# Patient Record
Sex: MALE | Race: WHITE | NOT HISPANIC OR LATINO | URBAN - METROPOLITAN AREA
[De-identification: names, ages, dates, MRNs, and addresses within clinical notes are randomized per-mention and may not be internally consistent; named-entity substitution may affect disease eponyms.]

---

## 2017-10-13 ENCOUNTER — OUTPATIENT (OUTPATIENT)
Dept: OUTPATIENT SERVICES | Facility: HOSPITAL | Age: 62
LOS: 1 days | Discharge: HOME | End: 2017-10-13
Payer: COMMERCIAL

## 2017-10-13 VITALS
WEIGHT: 201.94 LBS | RESPIRATION RATE: 14 BRPM | TEMPERATURE: 98 F | HEIGHT: 70 IN | SYSTOLIC BLOOD PRESSURE: 151 MMHG | OXYGEN SATURATION: 98 % | HEART RATE: 78 BPM | DIASTOLIC BLOOD PRESSURE: 93 MMHG

## 2017-10-13 DIAGNOSIS — Z98.890 OTHER SPECIFIED POSTPROCEDURAL STATES: Chronic | ICD-10-CM

## 2017-10-13 DIAGNOSIS — Z90.49 ACQUIRED ABSENCE OF OTHER SPECIFIED PARTS OF DIGESTIVE TRACT: Chronic | ICD-10-CM

## 2017-10-13 DIAGNOSIS — M16.12 UNILATERAL PRIMARY OSTEOARTHRITIS, LEFT HIP: ICD-10-CM

## 2017-10-13 DIAGNOSIS — Z90.89 ACQUIRED ABSENCE OF OTHER ORGANS: Chronic | ICD-10-CM

## 2017-10-13 DIAGNOSIS — Z96.641 PRESENCE OF RIGHT ARTIFICIAL HIP JOINT: Chronic | ICD-10-CM

## 2017-10-13 LAB
ANION GAP SERPL CALC-SCNC: 6 MMOL/L — SIGNIFICANT CHANGE UP (ref 5–17)
APPEARANCE UR: CLEAR — SIGNIFICANT CHANGE UP
BASOPHILS # BLD AUTO: 0.1 K/UL — SIGNIFICANT CHANGE UP (ref 0–0.2)
BASOPHILS NFR BLD AUTO: 1 % — SIGNIFICANT CHANGE UP (ref 0–2)
BILIRUB UR-MCNC: NEGATIVE — SIGNIFICANT CHANGE UP
BUN SERPL-MCNC: 18 MG/DL — SIGNIFICANT CHANGE UP (ref 7–23)
CALCIUM SERPL-MCNC: 8.9 MG/DL — SIGNIFICANT CHANGE UP (ref 8.5–10.1)
CHLORIDE SERPL-SCNC: 105 MMOL/L — SIGNIFICANT CHANGE UP (ref 96–108)
CO2 SERPL-SCNC: 28 MMOL/L — SIGNIFICANT CHANGE UP (ref 22–31)
COLOR SPEC: YELLOW — SIGNIFICANT CHANGE UP
CREAT SERPL-MCNC: 0.89 MG/DL — SIGNIFICANT CHANGE UP (ref 0.5–1.3)
DIFF PNL FLD: NEGATIVE — SIGNIFICANT CHANGE UP
EOSINOPHIL # BLD AUTO: 0.3 K/UL — SIGNIFICANT CHANGE UP (ref 0–0.5)
EOSINOPHIL NFR BLD AUTO: 4.4 % — SIGNIFICANT CHANGE UP (ref 0–6)
GLUCOSE SERPL-MCNC: 92 MG/DL — SIGNIFICANT CHANGE UP (ref 70–99)
GLUCOSE UR QL: NEGATIVE MG/DL — SIGNIFICANT CHANGE UP
HCT VFR BLD CALC: 43.4 % — SIGNIFICANT CHANGE UP (ref 39–50)
HGB BLD-MCNC: 14.7 G/DL — SIGNIFICANT CHANGE UP (ref 13–17)
KETONES UR-MCNC: NEGATIVE — SIGNIFICANT CHANGE UP
LEUKOCYTE ESTERASE UR-ACNC: NEGATIVE — SIGNIFICANT CHANGE UP
LYMPHOCYTES # BLD AUTO: 2 K/UL — SIGNIFICANT CHANGE UP (ref 1–3.3)
LYMPHOCYTES # BLD AUTO: 29.1 % — SIGNIFICANT CHANGE UP (ref 13–44)
MCHC RBC-ENTMCNC: 30.3 PG — SIGNIFICANT CHANGE UP (ref 27–34)
MCHC RBC-ENTMCNC: 34 GM/DL — SIGNIFICANT CHANGE UP (ref 32–36)
MCV RBC AUTO: 89.2 FL — SIGNIFICANT CHANGE UP (ref 80–100)
MONOCYTES # BLD AUTO: 0.6 K/UL — SIGNIFICANT CHANGE UP (ref 0–0.9)
MONOCYTES NFR BLD AUTO: 8.1 % — SIGNIFICANT CHANGE UP (ref 2–14)
NEUTROPHILS # BLD AUTO: 4 K/UL — SIGNIFICANT CHANGE UP (ref 1.8–7.4)
NEUTROPHILS NFR BLD AUTO: 57.4 % — SIGNIFICANT CHANGE UP (ref 43–77)
NITRITE UR-MCNC: NEGATIVE — SIGNIFICANT CHANGE UP
PH UR: 7 — SIGNIFICANT CHANGE UP (ref 5–8)
PLATELET # BLD AUTO: 248 K/UL — SIGNIFICANT CHANGE UP (ref 150–400)
POTASSIUM SERPL-MCNC: 4.1 MMOL/L — SIGNIFICANT CHANGE UP (ref 3.5–5.3)
POTASSIUM SERPL-SCNC: 4.1 MMOL/L — SIGNIFICANT CHANGE UP (ref 3.5–5.3)
PROT UR-MCNC: NEGATIVE MG/DL — SIGNIFICANT CHANGE UP
RBC # BLD: 4.86 M/UL — SIGNIFICANT CHANGE UP (ref 4.2–5.8)
RBC # FLD: 11.9 % — SIGNIFICANT CHANGE UP (ref 10.3–14.5)
SODIUM SERPL-SCNC: 139 MMOL/L — SIGNIFICANT CHANGE UP (ref 135–145)
SP GR SPEC: 1 — LOW (ref 1.01–1.02)
TYPE + AB SCN PNL BLD: SIGNIFICANT CHANGE UP
UROBILINOGEN FLD QL: NEGATIVE MG/DL — SIGNIFICANT CHANGE UP
WBC # BLD: 7 K/UL — SIGNIFICANT CHANGE UP (ref 3.8–10.5)
WBC # FLD AUTO: 7 K/UL — SIGNIFICANT CHANGE UP (ref 3.8–10.5)

## 2017-10-13 PROCEDURE — 93010 ELECTROCARDIOGRAM REPORT: CPT

## 2017-10-13 NOTE — H&P PST ADULT - ASSESSMENT
61 yo male scheduled for left THR with Dr. Gregory on 10/31/17    1. Labs as per surgeon  2. EKG  3. Medical clearance with PCP Dr Duran  4. discussed EZ sponges, mupirocin & day of procedure instructions  5. stat PTT, PT/INR on admit  6. Instructed to attend Joint Education Class wednesday 10/18/17

## 2017-10-13 NOTE — H&P PST ADULT - HISTORY OF PRESENT ILLNESS
61 yo male presents to PST prior to proposed procedure. c/o left hip pain x 18 months that increases with walking & stairs. Reports being woken up out of his sleep from pain. Reports pain 6/10 managed with ibuprofen daily x 2 months. Reports right THR 2016 with Dr Gregory.  now for said procedure.

## 2017-10-13 NOTE — H&P PST ADULT - PSH
H/O arthroscopy  right knee x 2  left knee x 1  S/P appendectomy    S/P colonoscopy    Status post total hip replacement, right

## 2017-10-14 LAB
MRSA PCR RESULT.: SIGNIFICANT CHANGE UP
S AUREUS DNA NOSE QL NAA+PROBE: SIGNIFICANT CHANGE UP

## 2017-10-30 RX ORDER — OXYCODONE HYDROCHLORIDE 5 MG/1
10 TABLET ORAL ONCE
Qty: 0 | Refills: 0 | Status: DISCONTINUED | OUTPATIENT
Start: 2017-10-31 | End: 2017-10-31

## 2017-10-30 RX ORDER — OXYCODONE HYDROCHLORIDE 5 MG/1
10 TABLET ORAL EVERY 12 HOURS
Qty: 0 | Refills: 0 | Status: DISCONTINUED | OUTPATIENT
Start: 2017-10-31 | End: 2017-11-02

## 2017-10-30 RX ORDER — OXYCODONE HYDROCHLORIDE 5 MG/1
10 TABLET ORAL EVERY 4 HOURS
Qty: 0 | Refills: 0 | Status: DISCONTINUED | OUTPATIENT
Start: 2017-10-31 | End: 2017-11-02

## 2017-10-30 RX ORDER — HYDROMORPHONE HYDROCHLORIDE 2 MG/ML
0.5 INJECTION INTRAMUSCULAR; INTRAVENOUS; SUBCUTANEOUS
Qty: 0 | Refills: 0 | Status: DISCONTINUED | OUTPATIENT
Start: 2017-10-31 | End: 2017-11-02

## 2017-10-30 RX ORDER — PANTOPRAZOLE SODIUM 20 MG/1
40 TABLET, DELAYED RELEASE ORAL ONCE
Qty: 0 | Refills: 0 | Status: COMPLETED | OUTPATIENT
Start: 2017-10-31 | End: 2017-10-31

## 2017-10-30 RX ORDER — ACETAMINOPHEN 500 MG
650 TABLET ORAL EVERY 6 HOURS
Qty: 0 | Refills: 0 | Status: DISCONTINUED | OUTPATIENT
Start: 2017-10-31 | End: 2017-10-31

## 2017-10-30 RX ORDER — OXYCODONE HYDROCHLORIDE 5 MG/1
5 TABLET ORAL EVERY 4 HOURS
Qty: 0 | Refills: 0 | Status: DISCONTINUED | OUTPATIENT
Start: 2017-10-31 | End: 2017-11-02

## 2017-10-30 RX ORDER — ACETAMINOPHEN 500 MG
650 TABLET ORAL ONCE
Qty: 0 | Refills: 0 | Status: COMPLETED | OUTPATIENT
Start: 2017-10-31 | End: 2017-10-31

## 2017-10-31 ENCOUNTER — INPATIENT (INPATIENT)
Facility: HOSPITAL | Age: 62
LOS: 1 days | Discharge: HOME HEALTH | End: 2017-11-02
Attending: ORTHOPAEDIC SURGERY | Admitting: ORTHOPAEDIC SURGERY
Payer: COMMERCIAL

## 2017-10-31 VITALS
HEART RATE: 86 BPM | SYSTOLIC BLOOD PRESSURE: 141 MMHG | WEIGHT: 201.94 LBS | OXYGEN SATURATION: 99 % | DIASTOLIC BLOOD PRESSURE: 90 MMHG | RESPIRATION RATE: 16 BRPM | TEMPERATURE: 98 F | HEIGHT: 70 IN

## 2017-10-31 DIAGNOSIS — Z96.641 PRESENCE OF RIGHT ARTIFICIAL HIP JOINT: Chronic | ICD-10-CM

## 2017-10-31 DIAGNOSIS — Z98.890 OTHER SPECIFIED POSTPROCEDURAL STATES: Chronic | ICD-10-CM

## 2017-10-31 DIAGNOSIS — Z90.49 ACQUIRED ABSENCE OF OTHER SPECIFIED PARTS OF DIGESTIVE TRACT: Chronic | ICD-10-CM

## 2017-10-31 LAB
ANION GAP SERPL CALC-SCNC: 5 MMOL/L — SIGNIFICANT CHANGE UP (ref 5–17)
APTT BLD: 30.1 SEC — SIGNIFICANT CHANGE UP (ref 27.5–37.4)
BUN SERPL-MCNC: 21 MG/DL — SIGNIFICANT CHANGE UP (ref 7–23)
CALCIUM SERPL-MCNC: 7.9 MG/DL — LOW (ref 8.5–10.1)
CHLORIDE SERPL-SCNC: 113 MMOL/L — HIGH (ref 96–108)
CO2 SERPL-SCNC: 27 MMOL/L — SIGNIFICANT CHANGE UP (ref 22–31)
CREAT SERPL-MCNC: 0.92 MG/DL — SIGNIFICANT CHANGE UP (ref 0.5–1.3)
GLUCOSE SERPL-MCNC: 96 MG/DL — SIGNIFICANT CHANGE UP (ref 70–99)
HCT VFR BLD CALC: 37.4 % — LOW (ref 39–50)
HGB BLD-MCNC: 12.3 G/DL — LOW (ref 13–17)
INR BLD: 1.04 RATIO — SIGNIFICANT CHANGE UP (ref 0.88–1.16)
MCHC RBC-ENTMCNC: 29.7 PG — SIGNIFICANT CHANGE UP (ref 27–34)
MCHC RBC-ENTMCNC: 32.9 GM/DL — SIGNIFICANT CHANGE UP (ref 32–36)
MCV RBC AUTO: 90.3 FL — SIGNIFICANT CHANGE UP (ref 80–100)
PLATELET # BLD AUTO: 219 K/UL — SIGNIFICANT CHANGE UP (ref 150–400)
POTASSIUM SERPL-MCNC: 4.4 MMOL/L — SIGNIFICANT CHANGE UP (ref 3.5–5.3)
POTASSIUM SERPL-SCNC: 4.4 MMOL/L — SIGNIFICANT CHANGE UP (ref 3.5–5.3)
PROTHROM AB SERPL-ACNC: 11.2 SEC — SIGNIFICANT CHANGE UP (ref 9.8–12.7)
RBC # BLD: 4.14 M/UL — LOW (ref 4.2–5.8)
RBC # FLD: 12.4 % — SIGNIFICANT CHANGE UP (ref 10.3–14.5)
SODIUM SERPL-SCNC: 145 MMOL/L — SIGNIFICANT CHANGE UP (ref 135–145)
WBC # BLD: 6.6 K/UL — SIGNIFICANT CHANGE UP (ref 3.8–10.5)
WBC # FLD AUTO: 6.6 K/UL — SIGNIFICANT CHANGE UP (ref 3.8–10.5)

## 2017-10-31 PROCEDURE — 73501 X-RAY EXAM HIP UNI 1 VIEW: CPT | Mod: 26

## 2017-10-31 PROCEDURE — 88305 TISSUE EXAM BY PATHOLOGIST: CPT | Mod: 26

## 2017-10-31 RX ORDER — DOCUSATE SODIUM 100 MG
100 CAPSULE ORAL THREE TIMES A DAY
Qty: 0 | Refills: 0 | Status: DISCONTINUED | OUTPATIENT
Start: 2017-10-31 | End: 2017-11-02

## 2017-10-31 RX ORDER — FERROUS SULFATE 325(65) MG
325 TABLET ORAL
Qty: 0 | Refills: 0 | Status: DISCONTINUED | OUTPATIENT
Start: 2017-10-31 | End: 2017-11-02

## 2017-10-31 RX ORDER — ENOXAPARIN SODIUM 100 MG/ML
40 INJECTION SUBCUTANEOUS EVERY 24 HOURS
Qty: 0 | Refills: 0 | Status: DISCONTINUED | OUTPATIENT
Start: 2017-11-01 | End: 2017-11-01

## 2017-10-31 RX ORDER — DIPHENHYDRAMINE HCL 50 MG
25 CAPSULE ORAL AT BEDTIME
Qty: 0 | Refills: 0 | Status: DISCONTINUED | OUTPATIENT
Start: 2017-10-31 | End: 2017-11-02

## 2017-10-31 RX ORDER — BENZOCAINE AND MENTHOL 5; 1 G/100ML; G/100ML
1 LIQUID ORAL EVERY 4 HOURS
Qty: 0 | Refills: 0 | Status: DISCONTINUED | OUTPATIENT
Start: 2017-10-31 | End: 2017-11-02

## 2017-10-31 RX ORDER — ASCORBIC ACID 60 MG
500 TABLET,CHEWABLE ORAL
Qty: 0 | Refills: 0 | Status: DISCONTINUED | OUTPATIENT
Start: 2017-10-31 | End: 2017-11-02

## 2017-10-31 RX ORDER — OXYCODONE HYDROCHLORIDE 5 MG/1
5 TABLET ORAL EVERY 4 HOURS
Qty: 0 | Refills: 0 | Status: DISCONTINUED | OUTPATIENT
Start: 2017-10-31 | End: 2017-10-31

## 2017-10-31 RX ORDER — CEFAZOLIN SODIUM 1 G
2000 VIAL (EA) INJECTION EVERY 6 HOURS
Qty: 0 | Refills: 0 | Status: COMPLETED | OUTPATIENT
Start: 2017-10-31 | End: 2017-10-31

## 2017-10-31 RX ORDER — ONDANSETRON 8 MG/1
4 TABLET, FILM COATED ORAL EVERY 6 HOURS
Qty: 0 | Refills: 0 | Status: DISCONTINUED | OUTPATIENT
Start: 2017-10-31 | End: 2017-11-02

## 2017-10-31 RX ORDER — OXYCODONE HYDROCHLORIDE 5 MG/1
10 TABLET ORAL EVERY 4 HOURS
Qty: 0 | Refills: 0 | Status: DISCONTINUED | OUTPATIENT
Start: 2017-10-31 | End: 2017-10-31

## 2017-10-31 RX ORDER — SODIUM CHLORIDE 9 MG/ML
1000 INJECTION INTRAMUSCULAR; INTRAVENOUS; SUBCUTANEOUS
Qty: 0 | Refills: 0 | Status: DISCONTINUED | OUTPATIENT
Start: 2017-10-31 | End: 2017-10-31

## 2017-10-31 RX ORDER — ACETAMINOPHEN 500 MG
650 TABLET ORAL EVERY 6 HOURS
Qty: 0 | Refills: 0 | Status: DISCONTINUED | OUTPATIENT
Start: 2017-10-31 | End: 2017-10-31

## 2017-10-31 RX ORDER — PANTOPRAZOLE SODIUM 20 MG/1
40 TABLET, DELAYED RELEASE ORAL DAILY
Qty: 0 | Refills: 0 | Status: DISCONTINUED | OUTPATIENT
Start: 2017-10-31 | End: 2017-11-02

## 2017-10-31 RX ORDER — ACETAMINOPHEN 500 MG
650 TABLET ORAL EVERY 6 HOURS
Qty: 0 | Refills: 0 | Status: DISCONTINUED | OUTPATIENT
Start: 2017-10-31 | End: 2017-11-02

## 2017-10-31 RX ORDER — HYDROMORPHONE HYDROCHLORIDE 2 MG/ML
1 INJECTION INTRAMUSCULAR; INTRAVENOUS; SUBCUTANEOUS
Qty: 0 | Refills: 0 | Status: DISCONTINUED | OUTPATIENT
Start: 2017-10-31 | End: 2017-10-31

## 2017-10-31 RX ORDER — HYDROMORPHONE HYDROCHLORIDE 2 MG/ML
0.5 INJECTION INTRAMUSCULAR; INTRAVENOUS; SUBCUTANEOUS
Qty: 0 | Refills: 0 | Status: DISCONTINUED | OUTPATIENT
Start: 2017-10-31 | End: 2017-10-31

## 2017-10-31 RX ORDER — POLYETHYLENE GLYCOL 3350 17 G/17G
17 POWDER, FOR SOLUTION ORAL DAILY
Qty: 0 | Refills: 0 | Status: DISCONTINUED | OUTPATIENT
Start: 2017-10-31 | End: 2017-11-02

## 2017-10-31 RX ORDER — SODIUM CHLORIDE 9 MG/ML
1000 INJECTION INTRAMUSCULAR; INTRAVENOUS; SUBCUTANEOUS ONCE
Qty: 0 | Refills: 0 | Status: COMPLETED | OUTPATIENT
Start: 2017-10-31 | End: 2017-10-31

## 2017-10-31 RX ORDER — DIPHENHYDRAMINE HCL 50 MG
25 CAPSULE ORAL EVERY 4 HOURS
Qty: 0 | Refills: 0 | Status: DISCONTINUED | OUTPATIENT
Start: 2017-10-31 | End: 2017-11-02

## 2017-10-31 RX ORDER — FOLIC ACID 0.8 MG
1 TABLET ORAL DAILY
Qty: 0 | Refills: 0 | Status: DISCONTINUED | OUTPATIENT
Start: 2017-10-31 | End: 2017-11-02

## 2017-10-31 RX ORDER — SODIUM CHLORIDE 9 MG/ML
1000 INJECTION, SOLUTION INTRAVENOUS
Qty: 0 | Refills: 0 | Status: DISCONTINUED | OUTPATIENT
Start: 2017-10-31 | End: 2017-11-02

## 2017-10-31 RX ORDER — SENNA PLUS 8.6 MG/1
2 TABLET ORAL AT BEDTIME
Qty: 0 | Refills: 0 | Status: DISCONTINUED | OUTPATIENT
Start: 2017-10-31 | End: 2017-11-02

## 2017-10-31 RX ADMIN — Medication 100 MILLIGRAM(S): at 22:01

## 2017-10-31 RX ADMIN — Medication 100 MILLIGRAM(S): at 17:08

## 2017-10-31 RX ADMIN — Medication 100 MILLIGRAM(S): at 23:05

## 2017-10-31 RX ADMIN — Medication 650 MILLIGRAM(S): at 09:57

## 2017-10-31 RX ADMIN — Medication 650 MILLIGRAM(S): at 17:07

## 2017-10-31 RX ADMIN — OXYCODONE HYDROCHLORIDE 10 MILLIGRAM(S): 5 TABLET ORAL at 09:57

## 2017-10-31 RX ADMIN — OXYCODONE HYDROCHLORIDE 5 MILLIGRAM(S): 5 TABLET ORAL at 14:07

## 2017-10-31 RX ADMIN — SODIUM CHLORIDE 2000 MILLILITER(S): 9 INJECTION INTRAMUSCULAR; INTRAVENOUS; SUBCUTANEOUS at 16:20

## 2017-10-31 RX ADMIN — PANTOPRAZOLE SODIUM 40 MILLIGRAM(S): 20 TABLET, DELAYED RELEASE ORAL at 09:57

## 2017-10-31 RX ADMIN — Medication 1 TABLET(S): at 22:00

## 2017-10-31 RX ADMIN — OXYCODONE HYDROCHLORIDE 10 MILLIGRAM(S): 5 TABLET ORAL at 22:00

## 2017-10-31 NOTE — BRIEF OPERATIVE NOTE - PROCEDURE
<<-----Click on this checkbox to enter Procedure Total hip arthroplasty  10/31/2017    Active  CATRINA

## 2017-10-31 NOTE — DISCHARGE NOTE ADULT - MEDICATION SUMMARY - MEDICATIONS TO TAKE
I will START or STAY ON the medications listed below when I get home from the hospital:    oxyCODONE-acetaminophen 5 mg-325 mg oral tablet  -- 1 tab(s) by mouth every 6 hours, As Needed -for severe pain MDD:6  -- Caution federal law prohibits the transfer of this drug to any person other  than the person for whom it was prescribed.  May cause drowsiness.  Alcohol may intensify this effect.  Use care when operating dangerous machinery.  This prescription cannot be refilled.  This product contains acetaminophen.  Do not use  with any other product containing acetaminophen to prevent possible liver damage.  Using more of this medication than prescribed may cause serious breathing problems.    -- Indication: For pain    warfarin 5 mg oral tablet  -- 1 tab(s) by mouth once a day, per AC team management  -- Indication: For blood clot prevention    Pepcid 20 mg oral tablet  -- 1 tab(s) by mouth 2 times a day  -- Indication: For home med    Colace 100 mg oral capsule  -- 1 cap(s) by mouth 2 times a day MDD:2, while on narcotics  -- Medication should be taken with plenty of water.    -- Indication: For Stool softener    pantoprazole 40 mg oral delayed release tablet  -- 1 tab(s) by mouth once a day, stomach protection  -- It is very important that you take or use this exactly as directed.  Do not skip doses or discontinue unless directed by your doctor.  Obtain medical advice before taking any non-prescription drugs as some may affect the action of this medication.  Swallow whole.  Do not crush.    -- Indication: For Stomach protection while on Coumadin, then resume pepcid I will START or STAY ON the medications listed below when I get home from the hospital:    oxyCODONE-acetaminophen 5 mg-325 mg oral tablet  -- 1 tab(s) by mouth every 6 hours, As Needed -for severe pain MDD:6  -- Caution federal law prohibits the transfer of this drug to any person other  than the person for whom it was prescribed.  May cause drowsiness.  Alcohol may intensify this effect.  Use care when operating dangerous machinery.  This prescription cannot be refilled.  This product contains acetaminophen.  Do not use  with any other product containing acetaminophen to prevent possible liver damage.  Using more of this medication than prescribed may cause serious breathing problems.    -- Indication: For pain    Coumadin 5 mg oral tablet  -- 1 tab(s) by mouth once a day (in the evening) MDD:5 mg    take as directed  -- Do not take this drug if you are pregnant.  It is very important that you take or use this exactly as directed.  Do not skip doses or discontinue unless directed by your doctor.  Obtain medical advice before taking any non-prescription drugs as some may affect the action of this medication.    -- Indication: For blood clot prevention    enoxaparin 40 mg/0.4 mL injectable solution  -- 40 milligram(s) injectable once a day MDD:40 mg    please dispense 5 syringes of lovenox 40mg/.4ml  -- It is very important that you take or use this exactly as directed.  Do not skip doses or discontinue unless directed by your doctor.    -- Indication: For blood clot prevention    Pepcid 20 mg oral tablet  -- 1 tab(s) by mouth 2 times a day  -- Indication: For home med    Colace 100 mg oral capsule  -- 1 cap(s) by mouth 2 times a day MDD:2, while on narcotics  -- Medication should be taken with plenty of water.    -- Indication: For Stool softener    pantoprazole 40 mg oral delayed release tablet  -- 1 tab(s) by mouth once a day, stomach protection  -- It is very important that you take or use this exactly as directed.  Do not skip doses or discontinue unless directed by your doctor.  Obtain medical advice before taking any non-prescription drugs as some may affect the action of this medication.  Swallow whole.  Do not crush.    -- Indication: For Stomach protection while on Coumadin, then resume pepcid

## 2017-10-31 NOTE — DISCHARGE NOTE ADULT - PLAN OF CARE
Return to baseline ADLs Discharge Instructions Total Hip Arthroplasty    Diet: Resume previous diet    Activity: WBAT. Rolling walker. Posterior Hip Dislocation Precautions. Abduction Pillow while in bed and Chair. Daily Physical Therapy.    Call with: fever over 101, wound redness, drainage or open area, calf pain/calf swelling.    Wound Care: Remove old and Place new Aquacel bandage to hip wound every 7days. Remove Sutures/Staples Post Op Day #14 (11/14/17). OK to Shower with Aquacel.  Avoid direct water beating on bandage.     DVT PE Prophylaxis:   Continue Xarelto for DVT prophylaxis 35 days total. See Rx/MedRec.  or  Daily Coumadin dosed to goal INR 2-3. Stop Lovenox/Heparin when INR>2. See Anticoagulation Instructions. See Rx/Med Rec.    Follow Up: Orthopedics, Dr. Gregory, in 10-14 days in office. Call to schedule. If going home, eRX sent to your pharmacy for . Discharge Instructions Total Hip Arthroplasty    1. Diet: Resume previous diet  2. Activity: WBAT. Rolling walker. Posterior Hip Dislocation Precautions. Abduction Pillow while in bed and Chair. Daily Physical Therapy.  3. Call with: fever over 101, wound redness, drainage or open area, calf pain/calf swelling.  4. Wound Care: Remove old and Place new Aquacel bandage to hip wound every 7days. Remove Staples Post Op Day #14 (11/14/17)) so long as wound is healed, no drainage or open area. OK to Shower with Aquacel. Must be an Aquacel. Avoid direct water beating on bandage.   5. DVT PE Prophylaxis: see med rec for details/dosing.  Daily Coumadin dosed to goal INR 2-3. Stop Lovenox or Heparin when INR>1.8. See Anticoagulation Instructions. See Med Rec.  6.  Continue Protonix daily while on Anticoagulant. An eRx has been sent to your pharmacy.  7. Labs: Check H&H weekly while on Anticoagulation. Check INR while on Coumadin.  8.  Follow Up: Orthopedics, 10-14 days in office. Call to schedule.   9. Pain Medication: eRX sent to your pharmacy, PErcocet, for . Discharge Instructions Total Hip Arthroplasty    1. Diet: Resume previous diet  2. Activity: WBAT. Rolling walker. Posterior Hip Dislocation Precautions. Abduction Pillow while in bed and Chair. Daily Physical Therapy.  3. Call with: fever over 101, wound redness, drainage or open area, calf pain/calf swelling.  4. Wound Care: DO NOT change bandage unless it is wet. IF so place a dry sterile 4x4s with tegaderm. Be careful not to rip off the mesh that is glued to the skin. There are no staples or sutures to remove. OK to Shower but Avoid direct water beating on bandage.   5. DVT PE Prophylaxis: see med rec for details/dosing.  Daily Coumadin dosed to goal INR 2-3. Stop Lovenox or Heparin when INR>1.8. See Anticoagulation Instructions. See Med Rec.  6.  Continue Protonix daily while on Anticoagulant. An eRx has been sent to your pharmacy.  7. Labs: Check H&H weekly while on Anticoagulation. Check INR while on Coumadin.  8.  Follow Up: Orthopedics, 10-14 days in office. Call to schedule.   9. Pain Medication: eRX sent to your pharmacy, Percocet, for . Do not take tylenol if taking percocet. wait 6hours to take Tylenol because Percocet has Tylenol in it.

## 2017-10-31 NOTE — DISCHARGE NOTE ADULT - MEDICATION SUMMARY - MEDICATIONS TO STOP TAKING
I will STOP taking the medications listed below when I get home from the hospital:    ibuprofen 800 mg oral tablet  -- 1 tab(s) by mouth 3 times a day    Tylenol 500 mg oral tablet  -- 2 tab(s) by mouth , As Needed

## 2017-10-31 NOTE — DISCHARGE NOTE ADULT - HOSPITAL COURSE
H&P:  Pt is a 62y Male  POD 3 s/p Total Hip Arthroplasty. Pt is afebrile with stable vital signs. Pain is controlled. Alert and Oriented. Exam reveals intact EHL FHL TA GS, +DP. Dressing is clean and dry with a New Aquacel bandage on.    Vital Signs Last 24 Hrs  T(C): 36.7 (10-31-17 @ 18:50), Max: 37.1 (10-31-17 @ 17:30)  T(F): 98 (10-31-17 @ 18:50), Max: 98.7 (10-31-17 @ 17:30)  HR: 81 (10-31-17 @ 18:50) (58 - 86)  BP: 140/81 (10-31-17 @ 18:50) (74/38 - 141/90)  BP(mean): --  RR: 15 (10-31-17 @ 18:50) (12 - 19)  SpO2: 100% (10-31-17 @ 18:50) (94% - 100%)                        12.3   6.6   )-----------( 219      ( 31 Oct 2017 13:40 )             37.4     PT/INR - ( 31 Oct 2017 09:52 )   PT: 11.2 sec;   INR: 1.04 ratio         PTT - ( 31 Oct 2017 09:52 )  PTT:30.1 sec    Hospital Course:  Patient presented to [facility] after being medically cleared for an elective surgical procedure, having failed outpatient non-operative conservative management. Prophylactic antibiotics were started before the procedure and continued for 24 hours. They were admitted after surgery to the orthopedic floor.   There were no complications during the hospital stay.     Routine consults were obtained from the Anticoagulation Team for DVT/PE prophylaxis, from Physical Therapy for twice daily PT starting on POD 0, and from the Hospitalist for Medical Co-management. Patient was placed on _______ * for anticoagulation.  Pertinent home medications were continued.  Daily labs were followed.      On POD 0 the hemovac drain was removed. POD 2, PT was continued, and on POD 3 a new Aquacel dressing was applied. The pt is ready today for DC to home with home PT** or to Rehab for ongoing PT**.  The orthopedic Attending is aware and agrees. H&P:  Pt is a 62y Male  PAST MEDICAL & SURGICAL HISTORY:  Obesity  Hip pain, left  Osteoarthritis  H/O arthroscopy: right knee x 2  left knee x 1  S/P colonoscopy  S/P appendectomy  Status post total hip replacement, right       Now s/p Total Hip Arthroplasty. Pt is afebrile with stable vital signs. Pain is controlled. Alert and Oriented. Exam reveals intact EHL FHL TA GS, +DP. Dressing is clean and dry with a New Aquacel bandage on.  Vital Signs Last 24 Hrs  T(C): 37.6 (11-01-17 @ 11:51), Max: 37.6 (11-01-17 @ 11:51)  T(F): 99.6 (11-01-17 @ 11:51), Max: 99.6 (11-01-17 @ 11:51)  HR: 105 (11-01-17 @ 11:51) (58 - 110)  BP: 133/81 (11-01-17 @ 11:51) (74/38 - 151/77)  BP(mean): --  RR: 16 (11-01-17 @ 11:51) (12 - 19)  SpO2: 98% (11-01-17 @ 11:51) (94% - 100%)                        12.5   10.8  )-----------( 219      ( 01 Nov 2017 05:46 )             37.0     PT/INR - ( 31 Oct 2017 09:52 )   PT: 11.2 sec;   INR: 1.04 ratio         PTT - ( 31 Oct 2017 09:52 )  PTT:30.1 sec    Hospital Course:  Patient presented to  after being medically cleared for an elective surgical procedure, having failed outpatient non-operative conservative management. Prophylactic antibiotics were started before the procedure and continued for 24 hours. They were admitted after surgery to the orthopedic floor.   There were no complications during the hospital stay. All home medications were continued.     Routine consults were obtained from the Anticoagulation Team for DVT/PE prophylaxis, from Physical Therapy for twice daily PT starting on POD 0, and from the Hospitalist for Medical Co-management. Patient was placed on Coumadin and SC heparin until therapeutic for anticoagulation.  Pertinent home medications were continued.  Daily labs were followed.      On POD 0 or POD 1 the pt was OOB with PT and there were no overnight events. POD1 the hemovac drain was removed. POD 2, PT was continued, and on POD 2 a new Aquacel dressing was applied. The pt is ready today for DC to home with home PT.  The orthopedic Attending is aware and agrees. H&P:  Pt is a 62y Male  PAST MEDICAL & SURGICAL HISTORY:  Obesity  Hip pain, left  Osteoarthritis  H/O arthroscopy: right knee x 2  left knee x 1  S/P colonoscopy  S/P appendectomy  Status post total hip replacement, right       Now s/p Total Hip Arthroplasty. Pt is afebrile with stable vital signs. Pain is controlled. Alert and Oriented. Exam reveals intact EHL FHL TA GS, +DP. Dressing is clean and dry with a Prineo tegaderm dressing.  Vital Signs Last 24 Hrs  T(C): 37.6 (02 Nov 2017 06:24), Max: 37.8 (02 Nov 2017 03:07)  T(F): 99.6 (02 Nov 2017 06:24), Max: 100 (02 Nov 2017 03:07)  HR: 100 (02 Nov 2017 03:07) (100 - 105)  BP: 133/79 (02 Nov 2017 03:07) (133/79 - 146/83)  BP(mean): --  RR: 18 (02 Nov 2017 03:07) (16 - 18)  SpO2: 94% (02 Nov 2017 03:07) (94% - 98%)      Hospital Course:  Patient presented to [facility] after being medically cleared for an elective surgical procedure, having failed outpatient non-operative conservative management. Prophylactic antibiotics were started before the procedure and continued for 24 hours. They were admitted after surgery to the orthopedic floor.   There were no complications during the hospital stay. All home medications were continued.     Routine consults were obtained from the Anticoagulation Team for DVT/PE prophylaxis, from Physical Therapy for twice daily PT starting on POD 0, and from the Hospitalist for Medical Co-management. Patient was placed on Coumadin and SC heparin until therapeutic for                       11.6   10.6  )-----------( 187      ( 02 Nov 2017 05:45 )             34.9   PT/INR - ( 02 Nov 2017 05:45 )   PT: 14.5 sec;   INR: 1.33 ratio         anticoagulation.  Pertinent home medications were continued.  Daily labs were followed.      On POD 0 or POD 1 the pt was OOB with PT and there were no overnight events. POD1 the hemovac drain was removed. POD 2, PT was continued, and on POD 2 dressing was reinforced with tegaderm The pt is ready today for DC to home with home PT.  The orthopedic Attending is aware and agrees.

## 2017-10-31 NOTE — DISCHARGE NOTE ADULT - CARE PROVIDER_API CALL
Augustin Gregory (MD), Orthopaedic Surgery  [address] [place] [address]  Phone: [phone]  Fax: [phone]

## 2017-10-31 NOTE — DISCHARGE NOTE ADULT - PATIENT PORTAL LINK FT
“You can access the [employer] Patient Portal, offered by [facility], by registering with the following website: [email or web address]”

## 2017-10-31 NOTE — PROGRESS NOTE ADULT - SUBJECTIVE AND OBJECTIVE BOX
Orthopedics Post-op Check    This is a 61y/o Male s/p Left Total Hip Arthroplasty POD 0.  Pain is controlled. Pt is shivering, denies being cold, otherwise reporting he feels okay. No nausea or vomiting.     Pt received 1L bolus for hypotension earlier, now BP improved; 122/71 during exam.  Other VSS.    Vital Signs Last 24 Hrs  T(C): 36.1 (10-31-17 @ 13:11), Max: 36.4 (10-31-17 @ 09:36)  T(F): 97 (10-31-17 @ 13:11), Max: 97.5 (10-31-17 @ 09:36)  HR: 75 (10-31-17 @ 17:15) (58 - 86)  BP: 135/76 (10-31-17 @ 17:15) (74/38 - 141/90)  BP(mean): --  RR: 17 (10-31-17 @ 17:15) (12 - 19)  SpO2: 100% (10-31-17 @ 17:15) (94% - 100%)                        12.3   6.6   )-----------( 219      ( 31 Oct 2017 13:40 )             37.4     31 Oct 2017 13:40    145    |  113    |  21     ----------------------------<  96     4.4     |  27     |  0.92     Ca    7.9        31 Oct 2017 13:40      PT/INR - ( 31 Oct 2017 09:52 )   PT: 11.2 sec;   INR: 1.04 ratio         PTT - ( 31 Oct 2017 09:52 )  PTT:30.1 sec    Exam:  NAD AAOx3.  Dressing clean and dry; Hemovac in place.  Abduction pillow and SCDs in place.  Calves are soft and nontender.  Moving all toes, sensation intact.  DP and PT pulses 2+.    A/P:  Stable POD 0 Left Total Hip Arthroplasty  -Analgesia  -Ppx ABX  -DVT PE ppx  -OOB PT posterior dislocation precautions  -Abduction pillow

## 2017-10-31 NOTE — DISCHARGE NOTE ADULT - CARE PLAN
Principal Discharge DX:	Status post total hip replacement, left  Goal:	Return to baseline ADLs  Instructions for follow-up, activity and diet:	Discharge Instructions Total Hip Arthroplasty    Diet: Resume previous diet    Activity: WBAT. Rolling walker. Posterior Hip Dislocation Precautions. Abduction Pillow while in bed and Chair. Daily Physical Therapy.    Call with: fever over 101, wound redness, drainage or open area, calf pain/calf swelling.    Wound Care: Remove old and Place new Aquacel bandage to hip wound every 7days. Remove Sutures/Staples Post Op Day #14 (11/14/17). OK to Shower with Aquacel.  Avoid direct water beating on bandage.     DVT PE Prophylaxis:   Continue Xarelto for DVT prophylaxis 35 days total. See Rx/MedRec.  or  Daily Coumadin dosed to goal INR 2-3. Stop Lovenox/Heparin when INR>2. See Anticoagulation Instructions. See Rx/Med Rec.    Follow Up: Orthopedics, Dr. Gregory, in 10-14 days in office. Call to schedule. If going home, eRX sent to your pharmacy for . Principal Discharge DX:	Status post total hip replacement, left  Goal:	Return to baseline ADLs  Instructions for follow-up, activity and diet:	Discharge Instructions Total Hip Arthroplasty    1. Diet: Resume previous diet  2. Activity: WBAT. Rolling walker. Posterior Hip Dislocation Precautions. Abduction Pillow while in bed and Chair. Daily Physical Therapy.  3. Call with: fever over 101, wound redness, drainage or open area, calf pain/calf swelling.  4. Wound Care: Remove old and Place new Aquacel bandage to hip wound every 7days. Remove Staples Post Op Day #14 (11/14/17)) so long as wound is healed, no drainage or open area. OK to Shower with Aquacel. Must be an Aquacel. Avoid direct water beating on bandage.   5. DVT PE Prophylaxis: see med rec for details/dosing.  Daily Coumadin dosed to goal INR 2-3. Stop Lovenox or Heparin when INR>1.8. See Anticoagulation Instructions. See Med Rec.  6.  Continue Protonix daily while on Anticoagulant. An eRx has been sent to your pharmacy.  7. Labs: Check H&H weekly while on Anticoagulation. Check INR while on Coumadin.  8.  Follow Up: Orthopedics, 10-14 days in office. Call to schedule.   9. Pain Medication: eRX sent to your pharmacy, PErcocet, for . Principal Discharge DX:	Status post total hip replacement, left  Goal:	Return to baseline ADLs  Instructions for follow-up, activity and diet:	Discharge Instructions Total Hip Arthroplasty    1. Diet: Resume previous diet  2. Activity: WBAT. Rolling walker. Posterior Hip Dislocation Precautions. Abduction Pillow while in bed and Chair. Daily Physical Therapy.  3. Call with: fever over 101, wound redness, drainage or open area, calf pain/calf swelling.  4. Wound Care: DO NOT change bandage unless it is wet. IF so place a dry sterile 4x4s with tegaderm. Be careful not to rip off the mesh that is glued to the skin. There are no staples or sutures to remove. OK to Shower but Avoid direct water beating on bandage.   5. DVT PE Prophylaxis: see med rec for details/dosing.  Daily Coumadin dosed to goal INR 2-3. Stop Lovenox or Heparin when INR>1.8. See Anticoagulation Instructions. See Med Rec.  6.  Continue Protonix daily while on Anticoagulant. An eRx has been sent to your pharmacy.  7. Labs: Check H&H weekly while on Anticoagulation. Check INR while on Coumadin.  8.  Follow Up: Orthopedics, 10-14 days in office. Call to schedule.   9. Pain Medication: eRX sent to your pharmacy, Percocet, for . Do not take tylenol if taking percocet. wait 6hours to take Tylenol because Percocet has Tylenol in it.

## 2017-11-01 LAB
ANION GAP SERPL CALC-SCNC: 7 MMOL/L — SIGNIFICANT CHANGE UP (ref 5–17)
BUN SERPL-MCNC: 17 MG/DL — SIGNIFICANT CHANGE UP (ref 7–23)
CALCIUM SERPL-MCNC: 8.1 MG/DL — LOW (ref 8.5–10.1)
CHLORIDE SERPL-SCNC: 106 MMOL/L — SIGNIFICANT CHANGE UP (ref 96–108)
CO2 SERPL-SCNC: 24 MMOL/L — SIGNIFICANT CHANGE UP (ref 22–31)
CREAT SERPL-MCNC: 0.75 MG/DL — SIGNIFICANT CHANGE UP (ref 0.5–1.3)
GLUCOSE SERPL-MCNC: 136 MG/DL — HIGH (ref 70–99)
HCT VFR BLD CALC: 37 % — LOW (ref 39–50)
HGB BLD-MCNC: 12.5 G/DL — LOW (ref 13–17)
INR BLD: 1.2 RATIO — HIGH (ref 0.88–1.16)
MCHC RBC-ENTMCNC: 30.4 PG — SIGNIFICANT CHANGE UP (ref 27–34)
MCHC RBC-ENTMCNC: 33.9 GM/DL — SIGNIFICANT CHANGE UP (ref 32–36)
MCV RBC AUTO: 89.6 FL — SIGNIFICANT CHANGE UP (ref 80–100)
PLATELET # BLD AUTO: 219 K/UL — SIGNIFICANT CHANGE UP (ref 150–400)
POTASSIUM SERPL-MCNC: 3.4 MMOL/L — LOW (ref 3.5–5.3)
POTASSIUM SERPL-SCNC: 3.4 MMOL/L — LOW (ref 3.5–5.3)
PROTHROM AB SERPL-ACNC: 13 SEC — HIGH (ref 9.8–12.7)
RBC # BLD: 4.13 M/UL — LOW (ref 4.2–5.8)
RBC # FLD: 12.1 % — SIGNIFICANT CHANGE UP (ref 10.3–14.5)
SODIUM SERPL-SCNC: 137 MMOL/L — SIGNIFICANT CHANGE UP (ref 135–145)
WBC # BLD: 10.8 K/UL — HIGH (ref 3.8–10.5)
WBC # FLD AUTO: 10.8 K/UL — HIGH (ref 3.8–10.5)

## 2017-11-01 PROCEDURE — 99253 IP/OBS CNSLTJ NEW/EST LOW 45: CPT

## 2017-11-01 RX ORDER — SODIUM CHLORIDE 9 MG/ML
1000 INJECTION, SOLUTION INTRAVENOUS
Qty: 0 | Refills: 0 | Status: DISCONTINUED | OUTPATIENT
Start: 2017-11-01 | End: 2017-11-02

## 2017-11-01 RX ORDER — ACETAMINOPHEN 500 MG
2 TABLET ORAL
Qty: 0 | Refills: 0 | COMMUNITY

## 2017-11-01 RX ORDER — POTASSIUM CHLORIDE 20 MEQ
40 PACKET (EA) ORAL ONCE
Qty: 0 | Refills: 0 | Status: COMPLETED | OUTPATIENT
Start: 2017-11-01 | End: 2017-11-01

## 2017-11-01 RX ORDER — WARFARIN SODIUM 2.5 MG/1
1 TABLET ORAL
Qty: 0 | Refills: 0 | COMMUNITY
Start: 2017-11-01

## 2017-11-01 RX ORDER — ENOXAPARIN SODIUM 100 MG/ML
40 INJECTION SUBCUTANEOUS
Qty: 2 | Refills: 1 | OUTPATIENT
Start: 2017-11-01 | End: 2017-11-10

## 2017-11-01 RX ORDER — PANTOPRAZOLE SODIUM 20 MG/1
1 TABLET, DELAYED RELEASE ORAL
Qty: 14 | Refills: 0 | OUTPATIENT
Start: 2017-11-01 | End: 2017-11-15

## 2017-11-01 RX ORDER — WARFARIN SODIUM 2.5 MG/1
1 TABLET ORAL
Qty: 30 | Refills: 0 | OUTPATIENT
Start: 2017-11-01 | End: 2017-12-01

## 2017-11-01 RX ORDER — HEPARIN SODIUM 5000 [USP'U]/ML
5000 INJECTION INTRAVENOUS; SUBCUTANEOUS EVERY 8 HOURS
Qty: 0 | Refills: 0 | Status: DISCONTINUED | OUTPATIENT
Start: 2017-11-01 | End: 2017-11-02

## 2017-11-01 RX ORDER — IBUPROFEN 200 MG
1 TABLET ORAL
Qty: 0 | Refills: 0 | COMMUNITY

## 2017-11-01 RX ORDER — DOCUSATE SODIUM 100 MG
1 CAPSULE ORAL
Qty: 14 | Refills: 0 | OUTPATIENT
Start: 2017-11-01 | End: 2017-11-08

## 2017-11-01 RX ORDER — WARFARIN SODIUM 2.5 MG/1
5 TABLET ORAL DAILY
Qty: 0 | Refills: 0 | Status: DISCONTINUED | OUTPATIENT
Start: 2017-11-01 | End: 2017-11-02

## 2017-11-01 RX ADMIN — Medication 1 TABLET(S): at 21:41

## 2017-11-01 RX ADMIN — Medication 1 MILLIGRAM(S): at 09:45

## 2017-11-01 RX ADMIN — SODIUM CHLORIDE 100 MILLILITER(S): 9 INJECTION, SOLUTION INTRAVENOUS at 11:19

## 2017-11-01 RX ADMIN — OXYCODONE HYDROCHLORIDE 10 MILLIGRAM(S): 5 TABLET ORAL at 21:52

## 2017-11-01 RX ADMIN — OXYCODONE HYDROCHLORIDE 10 MILLIGRAM(S): 5 TABLET ORAL at 21:41

## 2017-11-01 RX ADMIN — WARFARIN SODIUM 5 MILLIGRAM(S): 2.5 TABLET ORAL at 21:41

## 2017-11-01 RX ADMIN — Medication 500 MILLIGRAM(S): at 06:04

## 2017-11-01 RX ADMIN — HEPARIN SODIUM 5000 UNIT(S): 5000 INJECTION INTRAVENOUS; SUBCUTANEOUS at 12:50

## 2017-11-01 RX ADMIN — Medication 100 MILLIGRAM(S): at 21:41

## 2017-11-01 RX ADMIN — Medication 325 MILLIGRAM(S): at 11:19

## 2017-11-01 RX ADMIN — Medication 1 TABLET(S): at 12:50

## 2017-11-01 RX ADMIN — SODIUM CHLORIDE 100 MILLILITER(S): 9 INJECTION, SOLUTION INTRAVENOUS at 21:42

## 2017-11-01 RX ADMIN — Medication 1 TABLET(S): at 09:46

## 2017-11-01 RX ADMIN — OXYCODONE HYDROCHLORIDE 10 MILLIGRAM(S): 5 TABLET ORAL at 10:00

## 2017-11-01 RX ADMIN — ENOXAPARIN SODIUM 40 MILLIGRAM(S): 100 INJECTION SUBCUTANEOUS at 06:05

## 2017-11-01 RX ADMIN — Medication 100 MILLIGRAM(S): at 12:49

## 2017-11-01 RX ADMIN — Medication 40 MILLIEQUIVALENT(S): at 09:42

## 2017-11-01 RX ADMIN — Medication 325 MILLIGRAM(S): at 17:24

## 2017-11-01 RX ADMIN — OXYCODONE HYDROCHLORIDE 10 MILLIGRAM(S): 5 TABLET ORAL at 09:43

## 2017-11-01 RX ADMIN — Medication 500 MILLIGRAM(S): at 17:24

## 2017-11-01 RX ADMIN — HEPARIN SODIUM 5000 UNIT(S): 5000 INJECTION INTRAVENOUS; SUBCUTANEOUS at 21:42

## 2017-11-01 RX ADMIN — Medication 1 TABLET(S): at 06:04

## 2017-11-01 RX ADMIN — Medication 100 MILLIGRAM(S): at 06:04

## 2017-11-01 RX ADMIN — Medication 325 MILLIGRAM(S): at 09:43

## 2017-11-01 NOTE — PHYSICAL THERAPY INITIAL EVALUATION ADULT - GENERAL OBSERVATIONS, REHAB EVAL
Pt. received sitting up in high back chair w/ pillow, flowtrons on. Pt. reports perform bed to commode transfer this am.

## 2017-11-01 NOTE — CONSULT NOTE ADULT - ASSESSMENT
This is a 62 year old male s/p left thp on 10-31-17.  Pt has high thrombosis risk and requires anticoagulation prophylaxis.  Plan:  Coumadin 5 mg PO daily startin weeks total adjust dose per INR  Heparin 5,000 units SQ Q8hour  Daily PT/INR  Daily CBC/BMP  Enc ambulation  Venodynes
Pt is a 61 y/o male with h/o osteoarthritis who has been having increasing Lt hip pain for some time.  His pain progressively got worst, difficulty with ADLs, quality of life and failed conservative measures.  He was admitted for elective Lt total hip replacement, post-op medicine consult called for comanagement.    * Osteoarthritis-s/p Lt total hip replacement.  Continue pain control, PT, DVT proph, monitor post-op labs and encourage use of incentive spirometry.  * Acute Blood Loss Anemia-surgical loss, monitor, po iron.  * Proph- lovenox  * Disp-OOB, PT  * Comm- d/w pt, RN

## 2017-11-01 NOTE — CONSULT NOTE ADULT - SUBJECTIVE AND OBJECTIVE BOX
HPI:    Patient is a 62y old  Male who presents with a chief complaint of "total hip replacement on the left" (31 Oct 2017 20:58)  pt s/p left thr on 10-31-17  Consulted by Dr. Gregory  for VTE prophylaxis, risk stratification, and anticoagulation management.    PAST MEDICAL & SURGICAL HISTORY:  Obesity  Hip pain, left  Osteoarthritis  H/O arthroscopy: right knee x 2  left knee x 1  S/P colonoscopy  S/P appendectomy  Status post total hip replacement, right    Caprini VTE Risk Score:8    IMPROVE Bleeding Risk Score:2.5    Falls Risk:   High (  )  Mod (x  )  Low (  )    EBL:  200 ml  cr cl 134.01    11-1-17 pt seen at bedside.  Discussed his anticoagulation with coumadin and hep overlapping.  Questions answered will reinforce as needed.    Vital Signs Last 24 Hrs  T(C): 37.2 (01 Nov 2017 09:18), Max: 37.2 (01 Nov 2017 09:18)  T(F): 99 (01 Nov 2017 09:18), Max: 99 (01 Nov 2017 09:18)  HR: 110 (01 Nov 2017 09:18) (58 - 110)  BP: 151/77 (01 Nov 2017 09:18) (74/38 - 151/77)  BP(mean): --  RR: 16 (01 Nov 2017 09:18) (12 - 19)  SpO2: 98% (01 Nov 2017 09:18) (94% - 100%)  FAMILY HISTORY:  Family history of hypertension (Mother)    Denies any personal or familial history of clotting or bleeding disorders.    Allergies    Celebrex (Other)    Intolerances        REVIEW OF SYSTEMS    (  )Fever	     (  )Constipation	(  )SOB				(  )Headache	(  )Dysuria  (  )Chills	     (  )Melena	(  )Dyspnea present on exertion	                    (  )Dizziness                    (  )Polyuria  (  )Nausea	     (  )Hematochezia	(  )Cough			                    (  )Syncope   	(  )Hematuria  (  )Vomiting    (  )Chest Pain	(  )Wheezing			(  )Weakness  (  )Diarrhea     (  )Palpitations	(  )Anorexia			(  )Myalgia    All other review of systems negative: Yes        PHYSICAL EXAM:    Constitutional: Appears Well    Neurological: A& O x 3    Skin: Warm    Respiratory and Thorax: normal effort; Breath sounds: normal; No rales/wheezing/rhonchi  	  Cardiovascular: S1, S2, regular, NMBR	    Gastrointestinal: BS + x 4Q, nontender	    Genitourinary:  Bladder nondistended, nontender    Musculoskeletal:   General Right:   no muscle/joint tenderness,   normal tone, no joint swelling,   ROM: limited/full	    General Left:   no muscle/joint tenderness,   normal tone, no joint swelling,   ROM: limited/full    Hip:  Left: Dressing CDI;       Lower extrems:   Right: no calf tenderness              negative ivis's sign               + pedal pulses    Left:   no calf tenderness              negative ivis's sign               + pedal pulses                          12.5   10.8  )-----------( 219      ( 01 Nov 2017 05:46 )             37.0       11-01    137  |  106  |  17  ----------------------------<  136<H>  3.4<L>   |  24  |  0.75    Ca    8.1<L>      01 Nov 2017 05:46        PT/INR - ( 31 Oct 2017 09:52 )   PT: 11.2 sec;   INR: 1.04 ratio         PTT - ( 31 Oct 2017 09:52 )  PTT:30.1 sec				    MEDICATIONS  (STANDING):  ascorbic acid 500 milliGRAM(s) Oral two times a day  calcium carbonate 1250 mG + Vitamin D (OsCal 500 + D) 1 Tablet(s) Oral three times a day  docusate sodium 100 milliGRAM(s) Oral three times a day  ferrous    sulfate 325 milliGRAM(s) Oral three times a day with meals  folic acid 1 milliGRAM(s) Oral daily  heparin  Injectable 5000 Unit(s) SubCutaneous every 8 hours  lactated ringers. 1000 milliLiter(s) IV Continuous <Continuous>  lactated ringers. 1000 milliLiter(s) IV Continuous <Continuous>  multivitamin 1 Tablet(s) Oral daily  oxyCODONE  ER Tablet 10 milliGRAM(s) Oral every 12 hours  pantoprazole    Tablet 40 milliGRAM(s) Oral daily  polyethylene glycol 3350 17 Gram(s) Oral daily  warfarin 5 milliGRAM(s) Oral daily          DVT Prophylaxis:  LMWH                   (  )  Heparin SQ           ( x )  Coumadin             (  x)  Xarelto                  (  )  Eliquis                   (  )  Venodynes           ( x )  Ambulation          (x  )  UFH                       (  )  Contraindicated  (  )

## 2017-11-01 NOTE — PROGRESS NOTE ADULT - SUBJECTIVE AND OBJECTIVE BOX
Orthopedics     POD 1 Total Hip Arthroplasty  Pain is controlled. Pt feeling well. No nausea or vomiting. Has been OOB with PT.    Vital Signs Last 24 Hrs  T(C): 37.2 (11-01-17 @ 09:18), Max: 37.2 (11-01-17 @ 09:18)  T(F): 99 (11-01-17 @ 09:18), Max: 99 (11-01-17 @ 09:18)  HR: 110 (11-01-17 @ 09:18) (58 - 110)  BP: 151/77 (11-01-17 @ 09:18) (74/38 - 151/77)  BP(mean): --  RR: 16 (11-01-17 @ 09:18) (12 - 19)  SpO2: 98% (11-01-17 @ 09:18) (94% - 100%)                        12.5   10.8  )-----------( 219      ( 01 Nov 2017 05:46 )             37.0     01 Nov 2017 05:46    137    |  106    |  17     ----------------------------<  136    3.4     |  24     |  0.75     Ca    8.1        01 Nov 2017 05:46      PT/INR - ( 31 Oct 2017 09:52 )   PT: 11.2 sec;   INR: 1.04 ratio         PTT - ( 31 Oct 2017 09:52 )  PTT:30.1 sec    Exam:  NAD AAOx3  Dressing clean and dry  +EHL FHL TA GS  SILT toes 1-5  +DP  Calf Soft NT    A/P:  Stable POD 1 LEFT Total Hip Arthroplasty  -Analgesia  -DVT PE ppx  -OOB PT posterior dislocation precautions  -Hemovac drain removed uneventfully

## 2017-11-01 NOTE — CONSULT NOTE ADULT - SUBJECTIVE AND OBJECTIVE BOX
Patient is a 62y old  Male who presents with a chief complaint of "I had my surgery"      HPI: Pt is a 61 y/o male with h/o osteoarthritis who has been having increasing Lt hip pain for some time.  His pain progressively got worst, difficulty with ADLs, quality of life and failed conservative measures.  He was admitted for elective Lt total hip replacement, post-op medicine consult called for comanagement.  Pt seen in 2N c/o mild Lt hip pain and wants to get out of bed.  No CP or SOB.      PAST MEDICAL & SURGICAL HISTORY:  Obesity  Hip pain, left  Osteoarthritis  H/O arthroscopy: right knee x 2  left knee x 1  S/P colonoscopy  S/P appendectomy  Status post total hip replacement, right      Allergies    Celebrex (Other)    Intolerances        MEDICATIONS  (STANDING):  ascorbic acid 500 milliGRAM(s) Oral two times a day  calcium carbonate 1250 mG + Vitamin D (OsCal 500 + D) 1 Tablet(s) Oral three times a day  docusate sodium 100 milliGRAM(s) Oral three times a day  enoxaparin Injectable 40 milliGRAM(s) SubCutaneous every 24 hours  ferrous    sulfate 325 milliGRAM(s) Oral three times a day with meals  folic acid 1 milliGRAM(s) Oral daily  lactated ringers. 1000 milliLiter(s) (75 mL/Hr) IV Continuous <Continuous>  multivitamin 1 Tablet(s) Oral daily  oxyCODONE  ER Tablet 10 milliGRAM(s) Oral every 12 hours  pantoprazole    Tablet 40 milliGRAM(s) Oral daily  polyethylene glycol 3350 17 Gram(s) Oral daily    MEDICATIONS  (PRN):  acetaminophen   Tablet 650 milliGRAM(s) Oral every 6 hours PRN For Temp over 38.3 C (100.94 F)  acetaminophen   Tablet. 650 milliGRAM(s) Oral every 6 hours PRN headache  aluminum hydroxide/magnesium hydroxide/simethicone Suspension 30 milliLiter(s) Oral four times a day PRN Indigestion  benzocaine 15 mG/menthol 3.6 mG Lozenge 1 Lozenge Oral every 4 hours PRN Sore Throat  diphenhydrAMINE   Capsule 25 milliGRAM(s) Oral every 4 hours PRN Rash and/or Itching  diphenhydrAMINE   Capsule 25 milliGRAM(s) Oral at bedtime PRN Insomnia  HYDROmorphone  Injectable 0.5 milliGRAM(s) SubCutaneous every 3 hours PRN Severe Pain (7 - 10)  ondansetron Injectable 4 milliGRAM(s) IV Push every 6 hours PRN Nausea and/or Vomiting  oxyCODONE    IR 5 milliGRAM(s) Oral every 4 hours PRN Mild Pain (1 - 3)  oxyCODONE    IR 10 milliGRAM(s) Oral every 4 hours PRN Moderate Pain (4 - 6)  senna 2 Tablet(s) Oral at bedtime PRN Constipation      FAMILY HISTORY:  Family history of hypertension (Mother)      Social History: , lives with spouse, social ETOH use, former smoker (0.5 ppd for 10 years, quit in 1980's)      Vital Signs Last 24 Hrs  T(C): 37.1 (01 Nov 2017 05:20), Max: 37.1 (31 Oct 2017 17:30)  T(F): 98.8 (01 Nov 2017 05:20), Max: 98.8 (01 Nov 2017 05:20)  HR: 110 (01 Nov 2017 05:20) (58 - 110)  BP: 132/85 (01 Nov 2017 05:20) (74/38 - 141/90)  BP(mean): --  RR: 16 (01 Nov 2017 05:20) (12 - 19)  SpO2: 96% (01 Nov 2017 05:20) (94% - 100%)    Daily Height in cm: 177.8 (31 Oct 2017 09:36)    Daily     I&O's Summary    31 Oct 2017 07:01  -  01 Nov 2017 07:00  --------------------------------------------------------  IN: 3506 mL / OUT: 1395 mL / NET: 2111 mL          Data                          12.5   10.8  )-----------( 219      ( 01 Nov 2017 05:46 )             37.0       11-01    137  |  106  |  17  ----------------------------<  136<H>  3.4<L>   |  24  |  0.75    Ca    8.1<L>      01 Nov 2017 05:46                      PT/INR - ( 31 Oct 2017 09:52 )   PT: 11.2 sec;   INR: 1.04 ratio         PTT - ( 31 Oct 2017 09:52 )  PTT:30.1 sec

## 2017-11-01 NOTE — PHYSICAL THERAPY INITIAL EVALUATION ADULT - ADDITIONAL COMMENTS
Pt. resides in split level house w/ HR w/ wife. No steps to enter house. Pt. has a RW, rollator and commode at home as pt. had R MALINDA in past.

## 2017-11-01 NOTE — PHYSICAL THERAPY INITIAL EVALUATION ADULT - MODALITIES TREATMENT COMMENTS
Pt. reports feeling lightheaded after ambulation. /77, HR 115bpm. RN aware of pt.'s complaints. Pt. responding to questions appropriately.

## 2017-11-01 NOTE — PROGRESS NOTE ADULT - SUBJECTIVE AND OBJECTIVE BOX
Pt S&E. Pain controlled. No acute events overnight    Vital Signs Last 24 Hrs  T(C): 37.1 (11-01-17 @ 05:20), Max: 37.1 (10-31-17 @ 17:30)  T(F): 98.8 (11-01-17 @ 05:20), Max: 98.8 (11-01-17 @ 05:20)  HR: 110 (11-01-17 @ 05:20) (58 - 110)  BP: 132/85 (11-01-17 @ 05:20) (74/38 - 141/90)  BP(mean): --  RR: 16 (11-01-17 @ 05:20) (12 - 19)  SpO2: 96% (11-01-17 @ 05:20) (94% - 100%)    Gen: NAD  LLE:  Dsg c/d/i  SILT L2-S1  +EHL/FHL/TA/Gastroc  DP+  Soft compartments, - calf ttp

## 2017-11-02 VITALS
TEMPERATURE: 99 F | OXYGEN SATURATION: 98 % | HEART RATE: 102 BPM | RESPIRATION RATE: 16 BRPM | SYSTOLIC BLOOD PRESSURE: 132 MMHG | DIASTOLIC BLOOD PRESSURE: 73 MMHG

## 2017-11-02 LAB
ANION GAP SERPL CALC-SCNC: 7 MMOL/L — SIGNIFICANT CHANGE UP (ref 5–17)
BUN SERPL-MCNC: 8 MG/DL — SIGNIFICANT CHANGE UP (ref 7–23)
CALCIUM SERPL-MCNC: 8.5 MG/DL — SIGNIFICANT CHANGE UP (ref 8.5–10.1)
CHLORIDE SERPL-SCNC: 107 MMOL/L — SIGNIFICANT CHANGE UP (ref 96–108)
CO2 SERPL-SCNC: 29 MMOL/L — SIGNIFICANT CHANGE UP (ref 22–31)
CREAT SERPL-MCNC: 0.72 MG/DL — SIGNIFICANT CHANGE UP (ref 0.5–1.3)
GLUCOSE SERPL-MCNC: 106 MG/DL — HIGH (ref 70–99)
HCT VFR BLD CALC: 34.9 % — LOW (ref 39–50)
HGB BLD-MCNC: 11.6 G/DL — LOW (ref 13–17)
INR BLD: 1.33 RATIO — HIGH (ref 0.88–1.16)
MCHC RBC-ENTMCNC: 29.8 PG — SIGNIFICANT CHANGE UP (ref 27–34)
MCHC RBC-ENTMCNC: 33.1 GM/DL — SIGNIFICANT CHANGE UP (ref 32–36)
MCV RBC AUTO: 89.8 FL — SIGNIFICANT CHANGE UP (ref 80–100)
PLATELET # BLD AUTO: 187 K/UL — SIGNIFICANT CHANGE UP (ref 150–400)
POTASSIUM SERPL-MCNC: 3.6 MMOL/L — SIGNIFICANT CHANGE UP (ref 3.5–5.3)
POTASSIUM SERPL-SCNC: 3.6 MMOL/L — SIGNIFICANT CHANGE UP (ref 3.5–5.3)
PROTHROM AB SERPL-ACNC: 14.5 SEC — HIGH (ref 9.8–12.7)
RBC # BLD: 3.89 M/UL — LOW (ref 4.2–5.8)
RBC # FLD: 12.3 % — SIGNIFICANT CHANGE UP (ref 10.3–14.5)
SODIUM SERPL-SCNC: 143 MMOL/L — SIGNIFICANT CHANGE UP (ref 135–145)
SURGICAL PATHOLOGY FINAL REPORT - CH: SIGNIFICANT CHANGE UP
WBC # BLD: 10.6 K/UL — HIGH (ref 3.8–10.5)
WBC # FLD AUTO: 10.6 K/UL — HIGH (ref 3.8–10.5)

## 2017-11-02 PROCEDURE — 99233 SBSQ HOSP IP/OBS HIGH 50: CPT

## 2017-11-02 RX ORDER — FAMOTIDINE 10 MG/ML
1 INJECTION INTRAVENOUS
Qty: 0 | Refills: 0 | COMMUNITY

## 2017-11-02 RX ORDER — ENOXAPARIN SODIUM 100 MG/ML
40 INJECTION SUBCUTANEOUS DAILY
Qty: 0 | Refills: 0 | Status: DISCONTINUED | OUTPATIENT
Start: 2017-11-02 | End: 2017-11-02

## 2017-11-02 RX ADMIN — HEPARIN SODIUM 5000 UNIT(S): 5000 INJECTION INTRAVENOUS; SUBCUTANEOUS at 06:23

## 2017-11-02 RX ADMIN — Medication 1 TABLET(S): at 06:23

## 2017-11-02 RX ADMIN — Medication 1 TABLET(S): at 14:31

## 2017-11-02 RX ADMIN — PANTOPRAZOLE SODIUM 40 MILLIGRAM(S): 20 TABLET, DELAYED RELEASE ORAL at 14:32

## 2017-11-02 RX ADMIN — Medication 325 MILLIGRAM(S): at 09:27

## 2017-11-02 RX ADMIN — OXYCODONE HYDROCHLORIDE 10 MILLIGRAM(S): 5 TABLET ORAL at 09:27

## 2017-11-02 RX ADMIN — Medication 100 MILLIGRAM(S): at 06:22

## 2017-11-02 RX ADMIN — Medication 1 MILLIGRAM(S): at 14:34

## 2017-11-02 RX ADMIN — Medication 500 MILLIGRAM(S): at 06:23

## 2017-11-02 RX ADMIN — Medication 1 TABLET(S): at 14:33

## 2017-11-02 RX ADMIN — ENOXAPARIN SODIUM 40 MILLIGRAM(S): 100 INJECTION SUBCUTANEOUS at 15:01

## 2017-11-02 RX ADMIN — Medication 100 MILLIGRAM(S): at 14:33

## 2017-11-02 RX ADMIN — POLYETHYLENE GLYCOL 3350 17 GRAM(S): 17 POWDER, FOR SOLUTION ORAL at 14:32

## 2017-11-02 RX ADMIN — Medication 325 MILLIGRAM(S): at 14:33

## 2017-11-02 NOTE — PROGRESS NOTE ADULT - SUBJECTIVE AND OBJECTIVE BOX
Pt c/o neck pain with uneventful night.  No new complaints.      Vital Signs Last 24 Hrs  T(C): 37.6 (02 Nov 2017 06:24), Max: 37.8 (02 Nov 2017 03:07)  T(F): 99.6 (02 Nov 2017 06:24), Max: 100 (02 Nov 2017 03:07)  HR: 100 (02 Nov 2017 03:07) (100 - 110)  BP: 133/79 (02 Nov 2017 03:07) (133/79 - 151/77)  BP(mean): --  RR: 18 (02 Nov 2017 03:07) (16 - 18)  SpO2: 94% (02 Nov 2017 03:07) (94% - 98%)    Daily     Daily     I&O's Detail    01 Nov 2017 07:01  -  02 Nov 2017 07:00  --------------------------------------------------------  IN:    Oral Fluid: 150 mL  Total IN: 150 mL    OUT:    Voided: 2650 mL  Total OUT: 2650 mL    Total NET: -2500 mL          CAPILLARY BLOOD GLUCOSE                                          11.6   10.6  )-----------( 187      ( 02 Nov 2017 05:45 )             34.9       11-02    143  |  107  |  8   ----------------------------<  106<H>  3.6   |  29  |  0.72    Ca    8.5      02 Nov 2017 05:45        PT/INR - ( 02 Nov 2017 05:45 )   PT: 14.5 sec;   INR: 1.33 ratio         PTT - ( 31 Oct 2017 09:52 )  PTT:30.1 sec                MEDICATIONS  (STANDING):  ascorbic acid 500 milliGRAM(s) Oral two times a day  calcium carbonate 1250 mG + Vitamin D (OsCal 500 + D) 1 Tablet(s) Oral three times a day  docusate sodium 100 milliGRAM(s) Oral three times a day  ferrous    sulfate 325 milliGRAM(s) Oral three times a day with meals  folic acid 1 milliGRAM(s) Oral daily  heparin  Injectable 5000 Unit(s) SubCutaneous every 8 hours  lactated ringers. 1000 milliLiter(s) (75 mL/Hr) IV Continuous <Continuous>  lactated ringers. 1000 milliLiter(s) (100 mL/Hr) IV Continuous <Continuous>  multivitamin 1 Tablet(s) Oral daily  oxyCODONE  ER Tablet 10 milliGRAM(s) Oral every 12 hours  pantoprazole    Tablet 40 milliGRAM(s) Oral daily  polyethylene glycol 3350 17 Gram(s) Oral daily  warfarin 5 milliGRAM(s) Oral daily    MEDICATIONS  (PRN):  acetaminophen   Tablet 650 milliGRAM(s) Oral every 6 hours PRN For Temp over 38.3 C (100.94 F)  acetaminophen   Tablet. 650 milliGRAM(s) Oral every 6 hours PRN headache  aluminum hydroxide/magnesium hydroxide/simethicone Suspension 30 milliLiter(s) Oral four times a day PRN Indigestion  benzocaine 15 mG/menthol 3.6 mG Lozenge 1 Lozenge Oral every 4 hours PRN Sore Throat  diphenhydrAMINE   Capsule 25 milliGRAM(s) Oral every 4 hours PRN Rash and/or Itching  diphenhydrAMINE   Capsule 25 milliGRAM(s) Oral at bedtime PRN Insomnia  HYDROmorphone  Injectable 0.5 milliGRAM(s) SubCutaneous every 3 hours PRN Severe Pain (7 - 10)  ondansetron Injectable 4 milliGRAM(s) IV Push every 6 hours PRN Nausea and/or Vomiting  oxyCODONE    IR 5 milliGRAM(s) Oral every 4 hours PRN Mild Pain (1 - 3)  oxyCODONE    IR 10 milliGRAM(s) Oral every 4 hours PRN Moderate Pain (4 - 6)  senna 2 Tablet(s) Oral at bedtime PRN Constipation Pt c/o Lt hip pain with low grade fever.  No cough, CP, SOB or urinary symptoms.        Vital Signs Last 24 Hrs  T(C): 37.6 (02 Nov 2017 06:24), Max: 37.8 (02 Nov 2017 03:07)  T(F): 99.6 (02 Nov 2017 06:24), Max: 100 (02 Nov 2017 03:07)  HR: 100 (02 Nov 2017 03:07) (100 - 110)  BP: 133/79 (02 Nov 2017 03:07) (133/79 - 151/77)  BP(mean): --  RR: 18 (02 Nov 2017 03:07) (16 - 18)  SpO2: 94% (02 Nov 2017 03:07) (94% - 98%)    Daily     Daily     I&O's Detail    01 Nov 2017 07:01  -  02 Nov 2017 07:00  --------------------------------------------------------  IN:    Oral Fluid: 150 mL  Total IN: 150 mL    OUT:    Voided: 2650 mL  Total OUT: 2650 mL    Total NET: -2500 mL          CAPILLARY BLOOD GLUCOSE                                          11.6   10.6  )-----------( 187      ( 02 Nov 2017 05:45 )             34.9       11-02    143  |  107  |  8   ----------------------------<  106<H>  3.6   |  29  |  0.72    Ca    8.5      02 Nov 2017 05:45        PT/INR - ( 02 Nov 2017 05:45 )   PT: 14.5 sec;   INR: 1.33 ratio         PTT - ( 31 Oct 2017 09:52 )  PTT:30.1 sec                MEDICATIONS  (STANDING):  ascorbic acid 500 milliGRAM(s) Oral two times a day  calcium carbonate 1250 mG + Vitamin D (OsCal 500 + D) 1 Tablet(s) Oral three times a day  docusate sodium 100 milliGRAM(s) Oral three times a day  ferrous    sulfate 325 milliGRAM(s) Oral three times a day with meals  folic acid 1 milliGRAM(s) Oral daily  heparin  Injectable 5000 Unit(s) SubCutaneous every 8 hours  lactated ringers. 1000 milliLiter(s) (75 mL/Hr) IV Continuous <Continuous>  lactated ringers. 1000 milliLiter(s) (100 mL/Hr) IV Continuous <Continuous>  multivitamin 1 Tablet(s) Oral daily  oxyCODONE  ER Tablet 10 milliGRAM(s) Oral every 12 hours  pantoprazole    Tablet 40 milliGRAM(s) Oral daily  polyethylene glycol 3350 17 Gram(s) Oral daily  warfarin 5 milliGRAM(s) Oral daily    MEDICATIONS  (PRN):  acetaminophen   Tablet 650 milliGRAM(s) Oral every 6 hours PRN For Temp over 38.3 C (100.94 F)  acetaminophen   Tablet. 650 milliGRAM(s) Oral every 6 hours PRN headache  aluminum hydroxide/magnesium hydroxide/simethicone Suspension 30 milliLiter(s) Oral four times a day PRN Indigestion  benzocaine 15 mG/menthol 3.6 mG Lozenge 1 Lozenge Oral every 4 hours PRN Sore Throat  diphenhydrAMINE   Capsule 25 milliGRAM(s) Oral every 4 hours PRN Rash and/or Itching  diphenhydrAMINE   Capsule 25 milliGRAM(s) Oral at bedtime PRN Insomnia  HYDROmorphone  Injectable 0.5 milliGRAM(s) SubCutaneous every 3 hours PRN Severe Pain (7 - 10)  ondansetron Injectable 4 milliGRAM(s) IV Push every 6 hours PRN Nausea and/or Vomiting  oxyCODONE    IR 5 milliGRAM(s) Oral every 4 hours PRN Mild Pain (1 - 3)  oxyCODONE    IR 10 milliGRAM(s) Oral every 4 hours PRN Moderate Pain (4 - 6)  senna 2 Tablet(s) Oral at bedtime PRN Constipation

## 2017-11-02 NOTE — PROGRESS NOTE ADULT - SUBJECTIVE AND OBJECTIVE BOX
Orthopedics     POD 2 Total Hip Arthroplasty  Pain is controlled. Pt feeling well. No nausea or vomiting. Has been OOB with PT.    Vital Signs Last 24 Hrs  T(C): 37.6 (11-02-17 @ 06:24), Max: 37.8 (11-02-17 @ 03:07)  T(F): 99.6 (11-02-17 @ 06:24), Max: 100 (11-02-17 @ 03:07)  HR: 100 (11-02-17 @ 03:07) (100 - 105)  BP: 133/79 (11-02-17 @ 03:07) (133/79 - 146/83)  BP(mean): --  RR: 18 (11-02-17 @ 03:07) (16 - 18)  SpO2: 94% (11-02-17 @ 03:07) (94% - 98%)                        11.6   10.6  )-----------( 187      ( 02 Nov 2017 05:45 )             34.9     02 Nov 2017 05:45    143    |  107    |  8      ----------------------------<  106    3.6     |  29     |  0.72     Ca    8.5        02 Nov 2017 05:45      PT/INR - ( 02 Nov 2017 05:45 )   PT: 14.5 sec;   INR: 1.33 ratio         PTT - ( 31 Oct 2017 09:52 )  PTT:30.1 sec  Exam:  NAD AAOx3  Wound without erythema or drainage  +EHL FHL TA GS  SILT toes 1-5  +DP  Calf Soft NT    A/P:  Stable POD 2 LEFT Total Hip Arthroplasty  -Analgesia  -DVT PE ppx  -OOB PT posterior hip precautions  -Tegaderm/Prineo dressing reinforced   -DC planning for today after PT if feeling well  -Discussed with Dr. Watson

## 2017-11-02 NOTE — PROGRESS NOTE ADULT - SUBJECTIVE AND OBJECTIVE BOX
HPI:    Patient is a 62y old  Male who presents with a chief complaint of "total hip replacement on the left" (31 Oct 2017 20:58)  pt s/p left thr on 10-31-17  Consulted by Dr. Gregory  for VTE prophylaxis, risk stratification, and anticoagulation management.    PAST MEDICAL & SURGICAL HISTORY:  Obesity  Hip pain, left  Osteoarthritis  H/O arthroscopy: right knee x 2  left knee x 1  S/P colonoscopy  S/P appendectomy  Status post total hip replacement, right    Caprini VTE Risk Score:8    IMPROVE Bleeding Risk Score:2.5    Falls Risk:   High (  )  Mod (x  )  Low (  )    EBL:  200 ml  cr cl 134.01    11-1-17 pt seen at bedside.  Discussed his anticoagulation with coumadin and hep overlapping.  Questions answered will reinforce as needed.  11-2-17 pt seen at bedside oob in chair.  states he is going home today.  discuses self inj of lovenox and he states his wife will be able to give hi the inj.  RN will teach.  Discussed dose of coumadin, questions answered literature provided.   Vital Signs Last 24 Hrs  T(C): 37.6 (11-02-17 @ 06:24), Max: 37.8 (11-02-17 @ 03:07)  T(F): 99.6 (11-02-17 @ 06:24), Max: 100 (11-02-17 @ 03:07)  HR: 100 (11-02-17 @ 03:07) (100 - 105)  BP: 133/79 (11-02-17 @ 03:07) (133/79 - 146/83)  BP(mean): --  RR: 18 (11-02-17 @ 03:07) (16 - 18)  SpO2: 94% (11-02-17 @ 03:07) (94% - 98%)  FAMILY HISTORY:  Family history of hypertension (Mother)    Denies any personal or familial history of clotting or bleeding disorders.    Allergies    Celebrex (Other)    Intolerances        REVIEW OF SYSTEMS    (  )Fever	     (  )Constipation	(  )SOB				(  )Headache	(  )Dysuria  (  )Chills	     (  )Melena	(  )Dyspnea present on exertion	                    (  )Dizziness                    (  )Polyuria  (  )Nausea	     (  )Hematochezia	(  )Cough			                    (  )Syncope   	(  )Hematuria  (  )Vomiting    (  )Chest Pain	(  )Wheezing			(  )Weakness  (  )Diarrhea     (  )Palpitations	(  )Anorexia			(  )Myalgia    All other review of systems negative: Yes      PHYSICAL EXAM:    Constitutional: Appears Well    Neurological: A& O x 3    Skin: Warm    Respiratory and Thorax: normal effort; Breath sounds: normal; No rales/wheezing/rhonchi  	  Cardiovascular: S1, S2, regular, NMBR	    Gastrointestinal: BS + x 4Q, nontender	    Genitourinary:  Bladder nondistended, nontender    Musculoskeletal:   General Right:   no muscle/joint tenderness,   normal tone, no joint swelling,   ROM: limited/full	    General Left:   no muscle/joint tenderness,   normal tone, no joint swelling,   ROM: limited/full    Hip:  Left: Dressing CDI;       Lower extrems:   Right: no calf tenderness              negative ivis's sign               + pedal pulses    Left:   no calf tenderness              negative ivis's sign               + pedal pulses                          11.6   10.6  )-----------( 187      ( 02 Nov 2017 05:45 )             34.9       11-02    143  |  107  |  8   ----------------------------<  106<H>  3.6   |  29  |  0.72    Ca    8.5      02 Nov 2017 05:45                            12.5   10.8  )-----------( 219      ( 01 Nov 2017 05:46 )             37.0       11-01    137  |  106  |  17  ----------------------------<  136<H>  3.4<L>   |  24  |  0.75    Ca    8.1<L>      01 Nov 2017 05:46    PT/INR - ( 02 Nov 2017 05:45 )   PT: 14.5 sec;   INR: 1.33 ratio   PT/INR - ( 31 Oct 2017 09:52 )   PT: 11.2 sec;   INR: 1.04 ratio         PTT - ( 31 Oct 2017 09:52 )  PTT:30.1 sec				    MEDICATIONS  (STANDING):  ascorbic acid 500 milliGRAM(s) Oral two times a day  calcium carbonate 1250 mG + Vitamin D (OsCal 500 + D) 1 Tablet(s) Oral three times a day  docusate sodium 100 milliGRAM(s) Oral three times a day  enoxaparin Injectable 40 milliGRAM(s) SubCutaneous daily  ferrous    sulfate 325 milliGRAM(s) Oral three times a day with meals  folic acid 1 milliGRAM(s) Oral daily  lactated ringers. 1000 milliLiter(s) IV Continuous <Continuous>  lactated ringers. 1000 milliLiter(s) IV Continuous <Continuous>  multivitamin 1 Tablet(s) Oral daily  oxyCODONE  ER Tablet 10 milliGRAM(s) Oral every 12 hours  pantoprazole    Tablet 40 milliGRAM(s) Oral daily  polyethylene glycol 3350 17 Gram(s) Oral daily  warfarin 5 milliGRAM(s) Oral daily          DVT Prophylaxis:  LMWH                   ( x )  Heparin SQ           (  )  Coumadin             (  x)  Xarelto                  (  )  Eliquis                   (  )  Venodynes           ( x )  Ambulation          (x  )  UFH                       (  )  Contraindicated  (  )

## 2017-11-02 NOTE — PROGRESS NOTE ADULT - SUBJECTIVE AND OBJECTIVE BOX
Pt S&E. Pain controlled. No acute events overnight    Vital Signs Last 24 Hrs  T(C): 37.8 (11-02-17 @ 03:07), Max: 37.8 (11-02-17 @ 03:07)  T(F): 100 (11-02-17 @ 03:07), Max: 100 (11-02-17 @ 03:07)  HR: 100 (11-02-17 @ 03:07) (100 - 110)  BP: 133/79 (11-02-17 @ 03:07) (133/79 - 151/77)  BP(mean): --  RR: 18 (11-02-17 @ 03:07) (16 - 18)  SpO2: 94% (11-02-17 @ 03:07) (94% - 98%)                          12.5   10.8  )-----------( 219      ( 01 Nov 2017 05:46 )             37.0     Gen: NAD  LLE:  Dsg c/d/i  SILT L2-S1  +EHL/FHL/TA/Gastroc  DP+  Soft compartments, - calf ttp

## 2017-11-02 NOTE — PROGRESS NOTE ADULT - ASSESSMENT
63 YO M s/p L MALINDA POD1  Pain control  DVT ppx  WBAT  PT  Monitor HMV output  Dispo planning
63 YO M s/p L MALINDA POD2  Pain control  DVT ppx  WBAT  PT  DC home today
This is a 62 year old male s/p left thp on 10-31-17.  Pt has high thrombosis risk and requires anticoagulation prophylaxis.  Plan:  Coumadin 5 mg PO daily startin weeks total adjust dose per INR  switch to lovenox 40mg sq daily going home today.  rx sent to pharmacy  repeat  PT/INR tomorrow as home draw.  Enc ambulation  Venodynes  will f/u with pt on discharge.
Pt is a 61 y/o male with h/o osteoarthritis who has been having increasing Lt hip pain for some time.  His pain progressively got worst, difficulty with ADLs, quality of life and failed conservative measures.  He was admitted for elective Lt total hip replacement, post-op medicine consult called for comanagement.    * Osteoarthritis-s/p Lt total hip replacement.  Continue pain control, PT, DVT proph, monitor post-op labs and encourage use of incentive spirometry.  * Low Grade Fever-due to atelectasis, monitor for now since he is non-toxic and no complaints.  Encourage use of incentive spirometry.  * Acute Blood Loss Anemia-surgical loss, monitor, po iron.  * Proph- heparin to coumadin  * Disp-OOB, PT, d/c planning for home with home care today  * Comm- d/w pt, RN

## 2017-11-06 DIAGNOSIS — J98.11 ATELECTASIS: ICD-10-CM

## 2017-11-06 DIAGNOSIS — Z87.891 PERSONAL HISTORY OF NICOTINE DEPENDENCE: ICD-10-CM

## 2017-11-06 DIAGNOSIS — I95.9 HYPOTENSION, UNSPECIFIED: ICD-10-CM

## 2017-11-06 DIAGNOSIS — M16.12 UNILATERAL PRIMARY OSTEOARTHRITIS, LEFT HIP: ICD-10-CM

## 2018-12-13 NOTE — PHYSICAL THERAPY INITIAL EVALUATION ADULT - ACTIVE RANGE OF MOTION EXAMINATION, REHAB EVAL
no Active ROM deficits were identified/except L hip flexion not tested/deficits as listed below follows commands/alert

## 2023-05-15 DIAGNOSIS — M16.11 UNILATERAL PRIMARY OSTEOARTHRITIS, RIGHT HIP: ICD-10-CM

## 2023-05-15 DIAGNOSIS — M70.61 TROCHANTERIC BURSITIS, RIGHT HIP: ICD-10-CM

## 2023-05-15 DIAGNOSIS — M16.12 UNILATERAL PRIMARY OSTEOARTHRITIS, LEFT HIP: ICD-10-CM

## 2023-05-15 DIAGNOSIS — M70.62 TROCHANTERIC BURSITIS, LEFT HIP: ICD-10-CM

## 2023-05-15 PROBLEM — Z00.00 ENCOUNTER FOR PREVENTIVE HEALTH EXAMINATION: Status: ACTIVE | Noted: 2023-05-15
